# Patient Record
Sex: MALE | Race: WHITE | Employment: PART TIME | URBAN - METROPOLITAN AREA
[De-identification: names, ages, dates, MRNs, and addresses within clinical notes are randomized per-mention and may not be internally consistent; named-entity substitution may affect disease eponyms.]

---

## 2019-07-12 ENCOUNTER — HOSPITAL ENCOUNTER (EMERGENCY)
Age: 24
Discharge: HOME | End: 2019-07-13
Attending: EMERGENCY MEDICINE
Payer: COMMERCIAL

## 2019-07-12 DIAGNOSIS — E83.42 HYPOMAGNESEMIA: ICD-10-CM

## 2019-07-12 DIAGNOSIS — R11.10 ABDOMINAL PAIN, VOMITING, AND DIARRHEA: Primary | ICD-10-CM

## 2019-07-12 DIAGNOSIS — R19.7 ABDOMINAL PAIN, VOMITING, AND DIARRHEA: Primary | ICD-10-CM

## 2019-07-12 DIAGNOSIS — R10.9 ABDOMINAL PAIN, VOMITING, AND DIARRHEA: Primary | ICD-10-CM

## 2019-07-12 LAB
ALBUMIN SERPL-MCNC: 4.8 G/DL (ref 3.5–5)
ALBUMIN/GLOB SERPL: 1.5 {RATIO} (ref 1.1–2.2)
ALP SERPL-CCNC: 91 U/L (ref 45–117)
ALT SERPL-CCNC: 23 U/L (ref 12–78)
ANION GAP SERPL CALC-SCNC: 11 MMOL/L (ref 5–15)
APPEARANCE UR: CLEAR
AST SERPL-CCNC: 18 U/L (ref 15–37)
BACTERIA URNS QL MICRO: NEGATIVE /HPF
BASOPHILS # BLD: 0 K/UL (ref 0–0.1)
BASOPHILS NFR BLD: 0 % (ref 0–1)
BILIRUB SERPL-MCNC: 1.8 MG/DL (ref 0.2–1)
BILIRUB UR QL: NEGATIVE
BUN SERPL-MCNC: 15 MG/DL (ref 6–20)
BUN/CREAT SERPL: 14 (ref 12–20)
CALCIUM SERPL-MCNC: 9.4 MG/DL (ref 8.5–10.1)
CHLORIDE SERPL-SCNC: 100 MMOL/L (ref 97–108)
CO2 SERPL-SCNC: 28 MMOL/L (ref 21–32)
COLOR UR: ABNORMAL
COMMENT, HOLDF: NORMAL
CREAT SERPL-MCNC: 1.07 MG/DL (ref 0.7–1.3)
DIFFERENTIAL METHOD BLD: ABNORMAL
EOSINOPHIL # BLD: 0 K/UL (ref 0–0.4)
EOSINOPHIL NFR BLD: 0 % (ref 0–7)
EPITH CASTS URNS QL MICRO: ABNORMAL /LPF
ERYTHROCYTE [DISTWIDTH] IN BLOOD BY AUTOMATED COUNT: 11.9 % (ref 11.5–14.5)
GLOBULIN SER CALC-MCNC: 3.2 G/DL (ref 2–4)
GLUCOSE SERPL-MCNC: 145 MG/DL (ref 65–100)
GLUCOSE UR STRIP.AUTO-MCNC: NEGATIVE MG/DL
HCT VFR BLD AUTO: 44.5 % (ref 36.6–50.3)
HGB BLD-MCNC: 15.2 G/DL (ref 12.1–17)
HGB UR QL STRIP: NEGATIVE
IMM GRANULOCYTES # BLD AUTO: 0 K/UL
IMM GRANULOCYTES NFR BLD AUTO: 0 %
KETONES UR QL STRIP.AUTO: 15 MG/DL
LACTATE BLD-SCNC: 2.22 MMOL/L (ref 0.4–2)
LEUKOCYTE ESTERASE UR QL STRIP.AUTO: NEGATIVE
LIPASE SERPL-CCNC: 101 U/L (ref 73–393)
LYMPHOCYTES # BLD: 0.3 K/UL (ref 0.8–3.5)
LYMPHOCYTES NFR BLD: 3 % (ref 12–49)
MAGNESIUM SERPL-MCNC: 1.5 MG/DL (ref 1.6–2.4)
MCH RBC QN AUTO: 28 PG (ref 26–34)
MCHC RBC AUTO-ENTMCNC: 34.2 G/DL (ref 30–36.5)
MCV RBC AUTO: 82.1 FL (ref 80–99)
MONOCYTES # BLD: 0.3 K/UL (ref 0–1)
MONOCYTES NFR BLD: 3 % (ref 5–13)
MUCOUS THREADS URNS QL MICRO: ABNORMAL /LPF
NEUTS BAND NFR BLD MANUAL: 1 % (ref 0–6)
NEUTS SEG # BLD: 8.6 K/UL (ref 1.8–8)
NEUTS SEG NFR BLD: 93 % (ref 32–75)
NITRITE UR QL STRIP.AUTO: NEGATIVE
NRBC # BLD: 0 K/UL (ref 0–0.01)
NRBC BLD-RTO: 0 PER 100 WBC
PH UR STRIP: 7 [PH] (ref 5–8)
PLATELET # BLD AUTO: 227 K/UL (ref 150–400)
PMV BLD AUTO: 10.3 FL (ref 8.9–12.9)
POTASSIUM SERPL-SCNC: 4.3 MMOL/L (ref 3.5–5.1)
PROT SERPL-MCNC: 8 G/DL (ref 6.4–8.2)
PROT UR STRIP-MCNC: NEGATIVE MG/DL
RBC # BLD AUTO: 5.42 M/UL (ref 4.1–5.7)
RBC #/AREA URNS HPF: ABNORMAL /HPF (ref 0–5)
RBC MORPH BLD: ABNORMAL
SAMPLES BEING HELD,HOLD: NORMAL
SODIUM SERPL-SCNC: 139 MMOL/L (ref 136–145)
SP GR UR REFRACTOMETRY: 1.01 (ref 1–1.03)
UR CULT HOLD, URHOLD: NORMAL
UROBILINOGEN UR QL STRIP.AUTO: 0.2 EU/DL (ref 0.2–1)
WBC # BLD AUTO: 9.2 K/UL (ref 4.1–11.1)
WBC URNS QL MICRO: ABNORMAL /HPF (ref 0–4)

## 2019-07-12 PROCEDURE — 80053 COMPREHEN METABOLIC PANEL: CPT

## 2019-07-12 PROCEDURE — 83735 ASSAY OF MAGNESIUM: CPT

## 2019-07-12 PROCEDURE — 96365 THER/PROPH/DIAG IV INF INIT: CPT

## 2019-07-12 PROCEDURE — 99284 EMERGENCY DEPT VISIT MOD MDM: CPT

## 2019-07-12 PROCEDURE — 83605 ASSAY OF LACTIC ACID: CPT

## 2019-07-12 PROCEDURE — 36415 COLL VENOUS BLD VENIPUNCTURE: CPT

## 2019-07-12 PROCEDURE — 83690 ASSAY OF LIPASE: CPT

## 2019-07-12 PROCEDURE — 96375 TX/PRO/DX INJ NEW DRUG ADDON: CPT

## 2019-07-12 PROCEDURE — 81001 URINALYSIS AUTO W/SCOPE: CPT

## 2019-07-12 PROCEDURE — 74011250636 HC RX REV CODE- 250/636: Performed by: EMERGENCY MEDICINE

## 2019-07-12 PROCEDURE — 96361 HYDRATE IV INFUSION ADD-ON: CPT

## 2019-07-12 PROCEDURE — 85025 COMPLETE CBC W/AUTO DIFF WBC: CPT

## 2019-07-12 RX ORDER — KETOROLAC TROMETHAMINE 30 MG/ML
30 INJECTION, SOLUTION INTRAMUSCULAR; INTRAVENOUS
Status: COMPLETED | OUTPATIENT
Start: 2019-07-12 | End: 2019-07-12

## 2019-07-12 RX ORDER — ONDANSETRON 2 MG/ML
4 INJECTION INTRAMUSCULAR; INTRAVENOUS
Status: COMPLETED | OUTPATIENT
Start: 2019-07-12 | End: 2019-07-12

## 2019-07-12 RX ORDER — MAGNESIUM SULFATE HEPTAHYDRATE 40 MG/ML
2 INJECTION, SOLUTION INTRAVENOUS
Status: COMPLETED | OUTPATIENT
Start: 2019-07-12 | End: 2019-07-12

## 2019-07-12 RX ADMIN — KETOROLAC TROMETHAMINE 30 MG: 30 INJECTION, SOLUTION INTRAMUSCULAR at 22:21

## 2019-07-12 RX ADMIN — MAGNESIUM SULFATE HEPTAHYDRATE 2 G: 40 INJECTION, SOLUTION INTRAVENOUS at 21:09

## 2019-07-12 RX ADMIN — SODIUM CHLORIDE 1000 ML: 900 INJECTION, SOLUTION INTRAVENOUS at 19:53

## 2019-07-12 RX ADMIN — ONDANSETRON 4 MG: 2 INJECTION INTRAMUSCULAR; INTRAVENOUS at 19:53

## 2019-07-12 RX ADMIN — SODIUM CHLORIDE 1000 ML: 900 INJECTION, SOLUTION INTRAVENOUS at 21:14

## 2019-07-12 NOTE — ED TRIAGE NOTES
Abd pain, vomiting and diarrhea for a few days. Reports that during the vomiting episodes he feels tingling and numbness.

## 2019-07-13 VITALS
DIASTOLIC BLOOD PRESSURE: 70 MMHG | WEIGHT: 125.44 LBS | TEMPERATURE: 98 F | RESPIRATION RATE: 18 BRPM | OXYGEN SATURATION: 98 % | SYSTOLIC BLOOD PRESSURE: 115 MMHG | HEART RATE: 94 BPM

## 2019-07-13 RX ORDER — ONDANSETRON 4 MG/1
4 TABLET, ORALLY DISINTEGRATING ORAL
Qty: 20 TAB | Refills: 0 | Status: SHIPPED | OUTPATIENT
Start: 2019-07-13

## 2019-07-13 NOTE — ED NOTES
Mother reports that patient has been sick for 3 days. States that it began with abd pain and constipation, which can be typical for patient. He then began with diarrhea and was unable to hold anything down. Today he began with violent vomiting. States that when he vomits, he feels tingling and numbness in extremities. Has been unable to hold fluids down for 48 hours.

## 2019-07-13 NOTE — ED PROVIDER NOTES
Kandy Castellano is a 27 yo M with history of spina bifida, hydrocephalus and intracranial shunt who presents to the ED with abdominal pain, vomiting and diarrhea that has been present for 3 days but became more severe today. This afternoon he also started to feel tingling and numbness in his hands and feet. He states that he first had abdominal pain 3 days ago similar to previous episodes of constipation. The next day he felt better and had a little bit of diarrhea. Yesterday the diarrhea picked up and he also developed nausea and vomiting which today has been severe, constant and bilious. He continues to have brown watery stool. HE denies fever or chills. Past Medical History:   Diagnosis Date    Hydrocephalus     Intracranial shunt     Sp[facility] bifida [facility])        Past Surgical History:   Procedure Laterality Date    HX APPENDECTOMY           History reviewed. No pertinent family history.     Social History     Socioeconomic History    Marital status: SINGLE     Spouse name: Not on file    Number of children: Not on file    Years of education: Not on file    Highest education level: Not on file   Occupational History    Not on file   Social Needs    Financial resource strain: Not on file    Food insecurity:     Worry: Not on file     Inability: Not on file    Transportation needs:     Medical: Not on file     Non-medical: Not on file   Tobacco Use    Smoking status: Never Smoker    Smokeless tobacco: Never Used   Substance and Sexual Activity    Alcohol use: Not on file    Drug use: Not on file    Sexual activity: Not on file   Lifestyle    Physical activity:     Days per week: Not on file     Minutes per session: Not on file    Stress: Not on file   Relationships    Social connections:     Talks on phone: Not on file     Gets together: Not on file     Attends Restorationism service: Not on file     Active member of club or organization: Not on file     Attends meetings of clubs or organizations: Not on file     Relationship status: Not on file    Intimate partner violence:     Fear of current or ex partner: Not on file     Emotionally abused: Not on file     Physically abused: Not on file     Forced sexual activity: Not on file   Other Topics Concern    Not on file   Social History Narrative    Not on file         ALLERGIES: Latex    Review of Systems   Constitutional: Negative for fever. HENT: Negative for sore throat. Eyes: Negative for visual disturbance. Respiratory: Negative for cough. Cardiovascular: Negative for chest pain. Gastrointestinal: Positive for abdominal pain, diarrhea, nausea and vomiting. Genitourinary: Negative for dysuria. Musculoskeletal: Negative for back pain. Skin: Negative for rash. Neurological: Positive for numbness. Negative for headaches. Vitals:    07/12/19 1943   BP: 128/70   Pulse: (!) 101   Resp: 20   Temp: 98 °F (36.7 °C)   SpO2: 99%   Weight: 56.9 kg (125 lb 7.1 oz)            Physical Exam   Constitutional: He appears well-developed and well-nourished. He has a sickly appearance. HENT:   Head: Normocephalic and atraumatic. Mouth/Throat: Oropharynx is clear and moist.   Eyes: Conjunctivae and EOM are normal.   Neck: Normal range of motion and phonation normal. Neck supple. No meningismus   Cardiovascular: Normal rate. Pulmonary/Chest: Effort normal. No respiratory distress. He has no wheezes. He has no rhonchi. Abdominal: He exhibits no distension. There is no tenderness. Musculoskeletal: Normal range of motion. He exhibits no tenderness. Neurological: He is alert. He is not disoriented. He exhibits normal muscle tone. Skin: Skin is warm and dry. There is pallor. Nursing note and vitals reviewed. MDM     1:17 AM  Patient reassessed. Is feeling much better after IVNS, zofran and toradol. Tolerating PO. Has received 2 G Magneisum sulfate for low magnesium.   Suspect gastroenteritis  Will discharge home with prescription for zofran   Procedures

## 2019-07-13 NOTE — ED NOTES
IV assessed. Patient states that it feels cold and would like the fluids slowed down. Request crackers, blanket and ice chips.

## 2019-07-13 NOTE — ED NOTES
Patient reports feeling much better. Patient has tolerated keeping crackers and bottles of water down. Color has improved in face and is able to ambulate without issue.

## 2022-01-29 ENCOUNTER — HOSPITAL ENCOUNTER (EMERGENCY)
Age: 27
Discharge: HOME | End: 2022-01-29
Attending: EMERGENCY MEDICINE
Payer: COMMERCIAL

## 2022-01-29 ENCOUNTER — APPOINTMENT (OUTPATIENT)
Dept: ULTRASOUND IMAGING | Age: 27
End: 2022-01-29
Attending: EMERGENCY MEDICINE
Payer: COMMERCIAL

## 2022-01-29 ENCOUNTER — ANESTHESIA (OUTPATIENT)
Dept: SURGERY | Age: 27
End: 2022-01-29
Payer: COMMERCIAL

## 2022-01-29 ENCOUNTER — ANESTHESIA EVENT (OUTPATIENT)
Dept: SURGERY | Age: 27
End: 2022-01-29
Payer: COMMERCIAL

## 2022-01-29 VITALS
TEMPERATURE: 97.5 F | DIASTOLIC BLOOD PRESSURE: 68 MMHG | RESPIRATION RATE: 12 BRPM | HEART RATE: 71 BPM | OXYGEN SATURATION: 100 % | WEIGHT: 130 LBS | SYSTOLIC BLOOD PRESSURE: 132 MMHG

## 2022-01-29 DIAGNOSIS — N44.00 TORSION OF RIGHT TESTICLE: Primary | ICD-10-CM

## 2022-01-29 LAB
ALBUMIN SERPL-MCNC: 4.1 G/DL (ref 3.5–5)
ALBUMIN/GLOB SERPL: 1 {RATIO} (ref 1.1–2.2)
ALP SERPL-CCNC: 91 U/L (ref 45–117)
ALT SERPL-CCNC: 26 U/L (ref 12–78)
ANION GAP SERPL CALC-SCNC: 11 MMOL/L (ref 5–15)
APPEARANCE UR: ABNORMAL
AST SERPL-CCNC: 15 U/L (ref 15–37)
BACTERIA URNS QL MICRO: ABNORMAL /HPF
BASOPHILS # BLD: 0.1 K/UL (ref 0–0.1)
BASOPHILS NFR BLD: 1 % (ref 0–1)
BILIRUB SERPL-MCNC: 0.6 MG/DL (ref 0.2–1)
BILIRUB UR QL: NEGATIVE
BUN SERPL-MCNC: 8 MG/DL (ref 6–20)
BUN/CREAT SERPL: 8 (ref 12–20)
CALCIUM SERPL-MCNC: 9.1 MG/DL (ref 8.5–10.1)
CHLORIDE SERPL-SCNC: 100 MMOL/L (ref 97–108)
CO2 SERPL-SCNC: 28 MMOL/L (ref 21–32)
COLOR UR: YELLOW
COVID-19 RAPID TEST, COVR: NOT DETECTED
CREAT SERPL-MCNC: 0.95 MG/DL (ref 0.7–1.3)
DIFFERENTIAL METHOD BLD: NORMAL
EOSINOPHIL # BLD: 0.1 K/UL (ref 0–0.4)
EOSINOPHIL NFR BLD: 1 % (ref 0–7)
EPITH CASTS URNS QL MICRO: ABNORMAL /LPF
ERYTHROCYTE [DISTWIDTH] IN BLOOD BY AUTOMATED COUNT: 11.9 % (ref 11.5–14.5)
GLOBULIN SER CALC-MCNC: 4.2 G/DL (ref 2–4)
GLUCOSE SERPL-MCNC: 122 MG/DL (ref 65–100)
GLUCOSE UR STRIP.AUTO-MCNC: NEGATIVE MG/DL
HCT VFR BLD AUTO: 41.8 % (ref 36.6–50.3)
HGB BLD-MCNC: 14 G/DL (ref 12.1–17)
HGB UR QL STRIP: ABNORMAL
IMM GRANULOCYTES # BLD AUTO: 0 K/UL (ref 0–0.04)
IMM GRANULOCYTES NFR BLD AUTO: 0 % (ref 0–0.5)
KETONES UR QL STRIP.AUTO: NEGATIVE MG/DL
LEUKOCYTE ESTERASE UR QL STRIP.AUTO: ABNORMAL
LYMPHOCYTES # BLD: 1.9 K/UL (ref 0.8–3.5)
LYMPHOCYTES NFR BLD: 19 % (ref 12–49)
MCH RBC QN AUTO: 28.2 PG (ref 26–34)
MCHC RBC AUTO-ENTMCNC: 33.5 G/DL (ref 30–36.5)
MCV RBC AUTO: 84.1 FL (ref 80–99)
MONOCYTES # BLD: 0.6 K/UL (ref 0–1)
MONOCYTES NFR BLD: 6 % (ref 5–13)
MUCOUS THREADS URNS QL MICRO: ABNORMAL /LPF
NEUTS SEG # BLD: 7.4 K/UL (ref 1.8–8)
NEUTS SEG NFR BLD: 73 % (ref 32–75)
NITRITE UR QL STRIP.AUTO: NEGATIVE
NRBC # BLD: 0 K/UL (ref 0–0.01)
NRBC BLD-RTO: 0 PER 100 WBC
PH UR STRIP: 5.5 [PH] (ref 5–8)
PLATELET # BLD AUTO: 391 K/UL (ref 150–400)
PMV BLD AUTO: 9.5 FL (ref 8.9–12.9)
POTASSIUM SERPL-SCNC: 3.8 MMOL/L (ref 3.5–5.1)
PROT SERPL-MCNC: 8.3 G/DL (ref 6.4–8.2)
PROT UR STRIP-MCNC: ABNORMAL MG/DL
RBC # BLD AUTO: 4.97 M/UL (ref 4.1–5.7)
RBC #/AREA URNS HPF: ABNORMAL /HPF (ref 0–5)
SODIUM SERPL-SCNC: 139 MMOL/L (ref 136–145)
SOURCE, COVRS: NORMAL
SP GR UR REFRACTOMETRY: 1.02 (ref 1–1.03)
UROBILINOGEN UR QL STRIP.AUTO: 0.2 EU/DL (ref 0.2–1)
WBC # BLD AUTO: 10.1 K/UL (ref 4.1–11.1)
WBC URNS QL MICRO: >100 /HPF (ref 0–4)

## 2022-01-29 PROCEDURE — 87186 SC STD MICRODIL/AGAR DIL: CPT

## 2022-01-29 PROCEDURE — 74011000250 HC RX REV CODE- 250: Performed by: UROLOGY

## 2022-01-29 PROCEDURE — 77030031139 HC SUT VCRL2 J&J -A: Performed by: UROLOGY

## 2022-01-29 PROCEDURE — 76870 US EXAM SCROTUM: CPT

## 2022-01-29 PROCEDURE — 76210000016 HC OR PH I REC 1 TO 1.5 HR: Performed by: UROLOGY

## 2022-01-29 PROCEDURE — 87040 BLOOD CULTURE FOR BACTERIA: CPT

## 2022-01-29 PROCEDURE — 36415 COLL VENOUS BLD VENIPUNCTURE: CPT

## 2022-01-29 PROCEDURE — 77030026438 HC STYL ET INTUB CARD -A: Performed by: STUDENT IN AN ORGANIZED HEALTH CARE EDUCATION/TRAINING PROGRAM

## 2022-01-29 PROCEDURE — 96375 TX/PRO/DX INJ NEW DRUG ADDON: CPT

## 2022-01-29 PROCEDURE — 77030002933 HC SUT MCRYL J&J -A: Performed by: UROLOGY

## 2022-01-29 PROCEDURE — 74011000250 HC RX REV CODE- 250: Performed by: EMERGENCY MEDICINE

## 2022-01-29 PROCEDURE — 81001 URINALYSIS AUTO W/SCOPE: CPT

## 2022-01-29 PROCEDURE — 77030008684 HC TU ET CUF COVD -B: Performed by: STUDENT IN AN ORGANIZED HEALTH CARE EDUCATION/TRAINING PROGRAM

## 2022-01-29 PROCEDURE — 87086 URINE CULTURE/COLONY COUNT: CPT

## 2022-01-29 PROCEDURE — 77030013079 HC BLNKT BAIR HGGR 3M -A: Performed by: STUDENT IN AN ORGANIZED HEALTH CARE EDUCATION/TRAINING PROGRAM

## 2022-01-29 PROCEDURE — 77030002986 HC SUT PROL J&J -A: Performed by: UROLOGY

## 2022-01-29 PROCEDURE — 74011000250 HC RX REV CODE- 250: Performed by: ANESTHESIOLOGY

## 2022-01-29 PROCEDURE — 80053 COMPREHEN METABOLIC PANEL: CPT

## 2022-01-29 PROCEDURE — 99284 EMERGENCY DEPT VISIT MOD MDM: CPT

## 2022-01-29 PROCEDURE — 74011250636 HC RX REV CODE- 250/636: Performed by: ANESTHESIOLOGY

## 2022-01-29 PROCEDURE — 74011250637 HC RX REV CODE- 250/637: Performed by: STUDENT IN AN ORGANIZED HEALTH CARE EDUCATION/TRAINING PROGRAM

## 2022-01-29 PROCEDURE — 76010000153 HC OR TIME 1.5 TO 2 HR: Performed by: UROLOGY

## 2022-01-29 PROCEDURE — 74011250636 HC RX REV CODE- 250/636: Performed by: EMERGENCY MEDICINE

## 2022-01-29 PROCEDURE — 2709999900 HC NON-CHARGEABLE SUPPLY: Performed by: UROLOGY

## 2022-01-29 PROCEDURE — 87077 CULTURE AEROBIC IDENTIFY: CPT

## 2022-01-29 PROCEDURE — 74011250636 HC RX REV CODE- 250/636: Performed by: STUDENT IN AN ORGANIZED HEALTH CARE EDUCATION/TRAINING PROGRAM

## 2022-01-29 PROCEDURE — 87635 SARS-COV-2 COVID-19 AMP PRB: CPT

## 2022-01-29 PROCEDURE — 96376 TX/PRO/DX INJ SAME DRUG ADON: CPT

## 2022-01-29 PROCEDURE — 85025 COMPLETE CBC W/AUTO DIFF WBC: CPT

## 2022-01-29 PROCEDURE — 77030010507 HC ADH SKN DERMBND J&J -B: Performed by: UROLOGY

## 2022-01-29 PROCEDURE — 76060000034 HC ANESTHESIA 1.5 TO 2 HR: Performed by: UROLOGY

## 2022-01-29 PROCEDURE — 96374 THER/PROPH/DIAG INJ IV PUSH: CPT

## 2022-01-29 PROCEDURE — 77030011283 HC ELECTRD NDL COVD -A: Performed by: UROLOGY

## 2022-01-29 PROCEDURE — 74011000250 HC RX REV CODE- 250: Performed by: STUDENT IN AN ORGANIZED HEALTH CARE EDUCATION/TRAINING PROGRAM

## 2022-01-29 RX ORDER — SODIUM CHLORIDE 0.9 % (FLUSH) 0.9 %
5-40 SYRINGE (ML) INJECTION AS NEEDED
Status: DISCONTINUED | OUTPATIENT
Start: 2022-01-29 | End: 2022-01-29 | Stop reason: HOSPADM

## 2022-01-29 RX ORDER — ONDANSETRON 2 MG/ML
INJECTION INTRAMUSCULAR; INTRAVENOUS AS NEEDED
Status: DISCONTINUED | OUTPATIENT
Start: 2022-01-29 | End: 2022-01-29 | Stop reason: HOSPADM

## 2022-01-29 RX ORDER — CEFAZOLIN SODIUM 1 G/3ML
INJECTION, POWDER, FOR SOLUTION INTRAMUSCULAR; INTRAVENOUS AS NEEDED
Status: DISCONTINUED | OUTPATIENT
Start: 2022-01-29 | End: 2022-01-29 | Stop reason: HOSPADM

## 2022-01-29 RX ORDER — HYDROMORPHONE HYDROCHLORIDE 1 MG/ML
0.2 INJECTION, SOLUTION INTRAMUSCULAR; INTRAVENOUS; SUBCUTANEOUS
Status: DISCONTINUED | OUTPATIENT
Start: 2022-01-29 | End: 2022-01-29 | Stop reason: HOSPADM

## 2022-01-29 RX ORDER — DEXAMETHASONE SODIUM PHOSPHATE 4 MG/ML
INJECTION, SOLUTION INTRA-ARTICULAR; INTRALESIONAL; INTRAMUSCULAR; INTRAVENOUS; SOFT TISSUE AS NEEDED
Status: DISCONTINUED | OUTPATIENT
Start: 2022-01-29 | End: 2022-01-29 | Stop reason: HOSPADM

## 2022-01-29 RX ORDER — SODIUM CHLORIDE 0.9 % (FLUSH) 0.9 %
5-40 SYRINGE (ML) INJECTION EVERY 8 HOURS
Status: DISCONTINUED | OUTPATIENT
Start: 2022-01-29 | End: 2022-01-29 | Stop reason: HOSPADM

## 2022-01-29 RX ORDER — FENTANYL CITRATE 50 UG/ML
50 INJECTION, SOLUTION INTRAMUSCULAR; INTRAVENOUS
Status: COMPLETED | OUTPATIENT
Start: 2022-01-29 | End: 2022-01-29

## 2022-01-29 RX ORDER — SODIUM CHLORIDE, SODIUM LACTATE, POTASSIUM CHLORIDE, CALCIUM CHLORIDE 600; 310; 30; 20 MG/100ML; MG/100ML; MG/100ML; MG/100ML
INJECTION, SOLUTION INTRAVENOUS
Status: DISCONTINUED | OUTPATIENT
Start: 2022-01-29 | End: 2022-01-29 | Stop reason: HOSPADM

## 2022-01-29 RX ORDER — FENTANYL CITRATE 50 UG/ML
50 INJECTION, SOLUTION INTRAMUSCULAR; INTRAVENOUS ONCE
Status: COMPLETED | OUTPATIENT
Start: 2022-01-29 | End: 2022-01-29

## 2022-01-29 RX ORDER — MORPHINE SULFATE 2 MG/ML
2 INJECTION, SOLUTION INTRAMUSCULAR; INTRAVENOUS
Status: DISCONTINUED | OUTPATIENT
Start: 2022-01-29 | End: 2022-01-29 | Stop reason: HOSPADM

## 2022-01-29 RX ORDER — SODIUM CHLORIDE, SODIUM LACTATE, POTASSIUM CHLORIDE, CALCIUM CHLORIDE 600; 310; 30; 20 MG/100ML; MG/100ML; MG/100ML; MG/100ML
125 INJECTION, SOLUTION INTRAVENOUS CONTINUOUS
Status: DISCONTINUED | OUTPATIENT
Start: 2022-01-29 | End: 2022-01-29 | Stop reason: HOSPADM

## 2022-01-29 RX ORDER — ROCURONIUM BROMIDE 10 MG/ML
INJECTION, SOLUTION INTRAVENOUS AS NEEDED
Status: DISCONTINUED | OUTPATIENT
Start: 2022-01-29 | End: 2022-01-29 | Stop reason: HOSPADM

## 2022-01-29 RX ORDER — ONDANSETRON 2 MG/ML
4 INJECTION INTRAMUSCULAR; INTRAVENOUS AS NEEDED
Status: DISCONTINUED | OUTPATIENT
Start: 2022-01-29 | End: 2022-01-29 | Stop reason: HOSPADM

## 2022-01-29 RX ORDER — BUPIVACAINE HYDROCHLORIDE 2.5 MG/ML
INJECTION, SOLUTION EPIDURAL; INFILTRATION; INTRACAUDAL AS NEEDED
Status: DISCONTINUED | OUTPATIENT
Start: 2022-01-29 | End: 2022-01-29 | Stop reason: HOSPADM

## 2022-01-29 RX ORDER — PROPOFOL 10 MG/ML
INJECTION, EMULSION INTRAVENOUS AS NEEDED
Status: DISCONTINUED | OUTPATIENT
Start: 2022-01-29 | End: 2022-01-29 | Stop reason: HOSPADM

## 2022-01-29 RX ORDER — ACETAMINOPHEN 500 MG
1000 TABLET ORAL ONCE
Status: DISCONTINUED | OUTPATIENT
Start: 2022-01-29 | End: 2022-01-29 | Stop reason: HOSPADM

## 2022-01-29 RX ORDER — MIDAZOLAM HYDROCHLORIDE 1 MG/ML
0.5 INJECTION, SOLUTION INTRAMUSCULAR; INTRAVENOUS
Status: DISCONTINUED | OUTPATIENT
Start: 2022-01-29 | End: 2022-01-29 | Stop reason: HOSPADM

## 2022-01-29 RX ORDER — FENTANYL CITRATE 50 UG/ML
INJECTION, SOLUTION INTRAMUSCULAR; INTRAVENOUS AS NEEDED
Status: DISCONTINUED | OUTPATIENT
Start: 2022-01-29 | End: 2022-01-29 | Stop reason: HOSPADM

## 2022-01-29 RX ORDER — DIPHENHYDRAMINE HYDROCHLORIDE 50 MG/ML
12.5 INJECTION, SOLUTION INTRAMUSCULAR; INTRAVENOUS AS NEEDED
Status: DISCONTINUED | OUTPATIENT
Start: 2022-01-29 | End: 2022-01-29 | Stop reason: HOSPADM

## 2022-01-29 RX ORDER — MIDAZOLAM HYDROCHLORIDE 1 MG/ML
1 INJECTION, SOLUTION INTRAMUSCULAR; INTRAVENOUS AS NEEDED
Status: DISCONTINUED | OUTPATIENT
Start: 2022-01-29 | End: 2022-01-29 | Stop reason: HOSPADM

## 2022-01-29 RX ORDER — EPHEDRINE SULFATE/0.9% NACL/PF 50 MG/5 ML
5 SYRINGE (ML) INTRAVENOUS AS NEEDED
Status: DISCONTINUED | OUTPATIENT
Start: 2022-01-29 | End: 2022-01-29 | Stop reason: HOSPADM

## 2022-01-29 RX ORDER — DEXMEDETOMIDINE HYDROCHLORIDE 100 UG/ML
INJECTION, SOLUTION INTRAVENOUS AS NEEDED
Status: DISCONTINUED | OUTPATIENT
Start: 2022-01-29 | End: 2022-01-29 | Stop reason: HOSPADM

## 2022-01-29 RX ORDER — MIDAZOLAM HYDROCHLORIDE 1 MG/ML
INJECTION, SOLUTION INTRAMUSCULAR; INTRAVENOUS AS NEEDED
Status: DISCONTINUED | OUTPATIENT
Start: 2022-01-29 | End: 2022-01-29 | Stop reason: HOSPADM

## 2022-01-29 RX ORDER — ONDANSETRON 2 MG/ML
4 INJECTION INTRAMUSCULAR; INTRAVENOUS
Status: COMPLETED | OUTPATIENT
Start: 2022-01-29 | End: 2022-01-29

## 2022-01-29 RX ORDER — AMOXICILLIN 250 MG
1 CAPSULE ORAL 2 TIMES DAILY
Qty: 60 TABLET | Refills: 1 | Status: SHIPPED | OUTPATIENT
Start: 2022-01-29

## 2022-01-29 RX ORDER — GLYCOPYRROLATE 0.2 MG/ML
INJECTION INTRAMUSCULAR; INTRAVENOUS AS NEEDED
Status: DISCONTINUED | OUTPATIENT
Start: 2022-01-29 | End: 2022-01-29 | Stop reason: HOSPADM

## 2022-01-29 RX ORDER — IBUPROFEN 600 MG/1
600 TABLET ORAL
Status: DISCONTINUED | OUTPATIENT
Start: 2022-01-29 | End: 2022-01-29 | Stop reason: HOSPADM

## 2022-01-29 RX ORDER — LIDOCAINE HYDROCHLORIDE 10 MG/ML
0.1 INJECTION, SOLUTION EPIDURAL; INFILTRATION; INTRACAUDAL; PERINEURAL AS NEEDED
Status: DISCONTINUED | OUTPATIENT
Start: 2022-01-29 | End: 2022-01-29 | Stop reason: HOSPADM

## 2022-01-29 RX ORDER — SODIUM CHLORIDE 9 MG/ML
1000 INJECTION, SOLUTION INTRAVENOUS CONTINUOUS
Status: DISCONTINUED | OUTPATIENT
Start: 2022-01-29 | End: 2022-01-29 | Stop reason: HOSPADM

## 2022-01-29 RX ORDER — FENTANYL CITRATE 50 UG/ML
25 INJECTION, SOLUTION INTRAMUSCULAR; INTRAVENOUS
Status: DISCONTINUED | OUTPATIENT
Start: 2022-01-29 | End: 2022-01-29 | Stop reason: HOSPADM

## 2022-01-29 RX ORDER — SODIUM CHLORIDE 9 MG/ML
125 INJECTION, SOLUTION INTRAVENOUS CONTINUOUS
Status: DISCONTINUED | OUTPATIENT
Start: 2022-01-29 | End: 2022-01-29 | Stop reason: HOSPADM

## 2022-01-29 RX ORDER — OXYCODONE AND ACETAMINOPHEN 5; 325 MG/1; MG/1
1 TABLET ORAL AS NEEDED
Status: DISCONTINUED | OUTPATIENT
Start: 2022-01-29 | End: 2022-01-29 | Stop reason: HOSPADM

## 2022-01-29 RX ORDER — FENTANYL CITRATE 50 UG/ML
50 INJECTION, SOLUTION INTRAMUSCULAR; INTRAVENOUS AS NEEDED
Status: DISCONTINUED | OUTPATIENT
Start: 2022-01-29 | End: 2022-01-29 | Stop reason: HOSPADM

## 2022-01-29 RX ORDER — OXYCODONE HYDROCHLORIDE 5 MG/1
5 TABLET ORAL
Qty: 10 TABLET | Refills: 0 | Status: SHIPPED | OUTPATIENT
Start: 2022-01-29 | End: 2022-02-05

## 2022-01-29 RX ORDER — LIDOCAINE HYDROCHLORIDE 20 MG/ML
INJECTION, SOLUTION EPIDURAL; INFILTRATION; INTRACAUDAL; PERINEURAL AS NEEDED
Status: DISCONTINUED | OUTPATIENT
Start: 2022-01-29 | End: 2022-01-29 | Stop reason: HOSPADM

## 2022-01-29 RX ORDER — SODIUM CHLORIDE, SODIUM LACTATE, POTASSIUM CHLORIDE, CALCIUM CHLORIDE 600; 310; 30; 20 MG/100ML; MG/100ML; MG/100ML; MG/100ML
1000 INJECTION, SOLUTION INTRAVENOUS CONTINUOUS
Status: DISCONTINUED | OUTPATIENT
Start: 2022-01-29 | End: 2022-01-29 | Stop reason: HOSPADM

## 2022-01-29 RX ADMIN — PROPOFOL 120 MG: 10 INJECTION, EMULSION INTRAVENOUS at 12:52

## 2022-01-29 RX ADMIN — DEXAMETHASONE SODIUM PHOSPHATE 4 MG: 4 INJECTION, SOLUTION INTRAMUSCULAR; INTRAVENOUS at 13:17

## 2022-01-29 RX ADMIN — SODIUM CHLORIDE 1000 ML: 9 INJECTION, SOLUTION INTRAVENOUS at 07:35

## 2022-01-29 RX ADMIN — CEFTRIAXONE SODIUM 1 G: 1 INJECTION, POWDER, FOR SOLUTION INTRAMUSCULAR; INTRAVENOUS at 11:22

## 2022-01-29 RX ADMIN — OXYCODONE HYDROCHLORIDE AND ACETAMINOPHEN 1 TABLET: 5; 325 TABLET ORAL at 15:26

## 2022-01-29 RX ADMIN — DEXMEDETOMIDINE HYDROCHLORIDE 5 MCG: 100 INJECTION, SOLUTION, CONCENTRATE INTRAVENOUS at 13:47

## 2022-01-29 RX ADMIN — GLYCOPYRROLATE 0.2 MG: 0.2 INJECTION, SOLUTION INTRAMUSCULAR; INTRAVENOUS at 13:38

## 2022-01-29 RX ADMIN — SODIUM CHLORIDE, POTASSIUM CHLORIDE, SODIUM LACTATE AND CALCIUM CHLORIDE: 600; 310; 30; 20 INJECTION, SOLUTION INTRAVENOUS at 12:44

## 2022-01-29 RX ADMIN — SODIUM CHLORIDE, PRESERVATIVE FREE 10 ML: 5 INJECTION INTRAVENOUS at 15:00

## 2022-01-29 RX ADMIN — ONDANSETRON HYDROCHLORIDE 4 MG: 2 SOLUTION INTRAMUSCULAR; INTRAVENOUS at 07:34

## 2022-01-29 RX ADMIN — FENTANYL CITRATE 50 MCG: 50 INJECTION INTRAMUSCULAR; INTRAVENOUS at 07:34

## 2022-01-29 RX ADMIN — SUGAMMADEX 200 MG: 100 INJECTION, SOLUTION INTRAVENOUS at 14:10

## 2022-01-29 RX ADMIN — SODIUM CHLORIDE, POTASSIUM CHLORIDE, SODIUM LACTATE AND CALCIUM CHLORIDE 1000 ML: 600; 310; 30; 20 INJECTION, SOLUTION INTRAVENOUS at 12:15

## 2022-01-29 RX ADMIN — FENTANYL CITRATE 50 MCG: 50 INJECTION INTRAMUSCULAR; INTRAVENOUS at 08:42

## 2022-01-29 RX ADMIN — DEXMEDETOMIDINE HYDROCHLORIDE 5 MCG: 100 INJECTION, SOLUTION, CONCENTRATE INTRAVENOUS at 13:51

## 2022-01-29 RX ADMIN — MIDAZOLAM 2 MG: 1 INJECTION INTRAMUSCULAR; INTRAVENOUS at 12:44

## 2022-01-29 RX ADMIN — FENTANYL CITRATE 50 MCG: 50 INJECTION, SOLUTION INTRAMUSCULAR; INTRAVENOUS at 12:52

## 2022-01-29 RX ADMIN — FENTANYL CITRATE 50 MCG: 50 INJECTION, SOLUTION INTRAMUSCULAR; INTRAVENOUS at 13:15

## 2022-01-29 RX ADMIN — FENTANYL CITRATE 50 MCG: 50 INJECTION, SOLUTION INTRAMUSCULAR; INTRAVENOUS at 10:49

## 2022-01-29 RX ADMIN — LIDOCAINE HYDROCHLORIDE 100 MG: 20 INJECTION, SOLUTION EPIDURAL; INFILTRATION; INTRACAUDAL; PERINEURAL at 12:52

## 2022-01-29 RX ADMIN — SODIUM CHLORIDE 125 ML/HR: 900 INJECTION, SOLUTION INTRAVENOUS at 15:00

## 2022-01-29 RX ADMIN — ONDANSETRON HYDROCHLORIDE 4 MG: 2 INJECTION, SOLUTION INTRAMUSCULAR; INTRAVENOUS at 13:46

## 2022-01-29 RX ADMIN — GLYCOPYRROLATE 0.2 MG: 0.2 INJECTION, SOLUTION INTRAMUSCULAR; INTRAVENOUS at 13:37

## 2022-01-29 RX ADMIN — CEFAZOLIN 2 G: 330 INJECTION, POWDER, FOR SOLUTION INTRAMUSCULAR; INTRAVENOUS at 13:14

## 2022-01-29 RX ADMIN — ROCURONIUM BROMIDE 50 MG: 10 SOLUTION INTRAVENOUS at 12:53

## 2022-01-29 NOTE — ANESTHESIA POSTPROCEDURE EVALUATION
Procedure(s):  RIGHT TESTICULAR TORSION REPAIR, bilateral orchiopexy, scrotal exploration. general    Anesthesia Post Evaluation      Multimodal analgesia: multimodal analgesia used between 6 hours prior to anesthesia start to PACU discharge  Patient location during evaluation: bedside  Patient participation: complete - patient participated  Level of consciousness: awake  Pain score: 0  Pain management: satisfactory to patient  Airway patency: patent  Anesthetic complications: no  Cardiovascular status: acceptable and blood pressure returned to baseline  Respiratory status: acceptable  Hydration status: acceptable  Comments: I have evaluated the patient and meets criteria for discharge from PACU. Mary Nicole DO.   Post anesthesia nausea and vomiting:  none  Final Post Anesthesia Temperature Assessment:  Normothermia (36.0-37.5 degrees C)      INITIAL Post-op Vital signs:   Vitals Value Taken Time   /63 01/29/22 1430   Temp 36.4 °C (97.5 °F) 01/29/22 1430   Pulse 80 01/29/22 1430   Resp 14 01/29/22 1430   SpO2 100 % 01/29/22 1426

## 2022-01-29 NOTE — ED NOTES
[facility] here to transport pt lights and sirens to [facility] ED. Pt VSS upon transfer.

## 2022-01-29 NOTE — ED NOTES
Pt arrives to the ED via EMS as a transfer from [facility] ER with a diagnosis of testicular torsion of the right teste. Pt also diagnosed UTI. Weight is 59 kg.

## 2022-01-29 NOTE — H&P
New Urology Consult Note      Service Providing Consult: Urology      Assessment:    Brenda Tran is a 27 y.o. male with right testicular torsion. Spina bifida history, possible UTI (may very well have chronic bacteriuria). Recommendations:  -- OR now for scrotal exploration, right testicular torsion reduction, bilateral orchiopexy, possible right orchiectomy  -- Understands risks of hematuria, infection, injury surrounding structures, left orchiopexy recommendation, possibility of infarcted or dead right testicle that may require orchiectomy. Discussed possibility of relation to infection and that there may not be torsion and this could be an infarcted testicle secondary to right epididymo-orchitis. Based on exam and history I do favor torsion though  -- Overnight stay vs outpatient procedure    Thank you for this consult. Please contact [facility] with any further questions/concerns. Mariela      HPI -     Reason for Consult:  Right testicular torsion    Brenda Tran is seen in consultation for reasons noted above at the request of Kaz Padilla MD.    This is a 27 y.o. male with a history of spina bifida,  shunt, hydrocephalus. Presented to stand alone [place] ER this morning with right testicle pain. Urine sample appears grossly infected. Scrotal US showed right torsion. Attempts at detorsion at standalone ER with Dr. Samantha Merino, transferred to [facility]. Pain better. Repeat scrotal US in setting of possible infection and improvement in symptoms with ongoing right testicular torsion. Symptoms this morning 6AM. Unaccompanied. No issues yesterday. Voids at baseline on his own with occasional incontinence, no CIC or Singh/SPT requirement. No recent fevers. Scrotal US x2 personally reviewed.     Past Medical History:  Past Medical History:   Diagnosis Date    Hydrocephalus ([facility] [address]     Intracranial shunt     Spina bifida [facility])        Past Surgical History:   Past Surgical History: Procedure Laterality Date    HX APPENDECTOMY         Medication:    Current Facility-Administered Medications:     ibuprofen (MOTRIN) tablet 600 mg, 600 mg, Oral, NOW, Juan Lopez MD    Current Outpatient Medications:     ondansetron (ZOFRAN ODT) 4 mg disintegrating tablet, Take 1 Tab by mouth every eight (8) hours as needed for Nausea., Disp: 20 Tab, Rfl: 0    Allergies: Allergies   Allergen Reactions    Latex Unknown (comments)       Social History:  Social History     Tobacco Use    Smoking status: Never Smoker    Smokeless tobacco: Never Used   Substance Use Topics    Alcohol use: Not on file    Drug use: Not on file       Family History  History reviewed. No pertinent family history. Review of Systems  10 systems were reviewed and are negative except as noted specifically in the HPI.     Objective     Vital signs in last 24 hours:  Visit Vitals  /81   Pulse 65   Temp 98.4 °F (36.9 °C)   Resp 18   Wt 59 kg (130 lb)   SpO2 97%       Intake/Output last 3 shifts:  No intake or output data in the 24 hours ending 01/29/22 1155    Physical Exam  General: NAD, A&O, resting, appropriate  HEENT: NC/AT, EOMI, MMM  Pulmonary: Normal work of breathing on RA  Cardiovascular: Regular rate & rhythm, HDS, adequate peripheral perfusion  Abdomen: soft, NTTP, nondistended, no suprapubic fullness or tenderness  : right testicle induration, TTP, transverse lie and high riding in hemiscrotum, no left sided abnormalities appreciated  Extremities: warm and well perfused, no edema    Most Recent Labs:  CBC -   Lab Results   Component Value Date/Time    WBC 10.1 01/29/2022 07:30 AM    HGB 14.0 01/29/2022 07:30 AM    HCT 41.8 01/29/2022 07:30 AM    PLATELET 217 62/29/9762 07:30 AM    MCV 84.1 01/29/2022 07:30 AM        BMP/CMP -   Lab Results   Component Value Date/Time    Sodium 139 01/29/2022 07:30 AM    Potassium 3.8 01/29/2022 07:30 AM    Chloride 100 01/29/2022 07:30 AM    CO2 28 01/29/2022 07:30 AM    Anion gap 11 01/29/2022 07:30 AM    Glucose 122 (H) 01/29/2022 07:30 AM    BUN 8 01/29/2022 07:30 AM    Creatinine 0.95 01/29/2022 07:30 AM    BUN/Creatinine ratio 8 (L) 01/29/2022 07:30 AM    GFR est AA >60 01/29/2022 07:30 AM    GFR est non-AA >60 01/29/2022 07:30 AM    Calcium 9.1 01/29/2022 07:30 AM    Bilirubin, total 0.6 01/29/2022 07:30 AM    Alk. phosphatase 91 01/29/2022 07:30 AM    Protein, total 8.3 (H) 01/29/2022 07:30 AM    Albumin 4.1 01/29/2022 07:30 AM    Globulin 4.2 (H) 01/29/2022 07:30 AM    A-G Ratio 1.0 (L) 01/29/2022 07:30 AM    ALT (SGPT) 26 01/29/2022 07:30 AM        PSA - No results found for: PSA, PSA2, PSAR1, Christinia Otter, PSAR3, [id number], [id number], 40481, PSAEXT     COAGS - No results found for: APTT, PTP, INR, INREXT    HbA1c - No results found for: HBA1C, OJP3LIBN, UDC6UKYC     Urine/Blood Cultures:  Results     Procedure Component Value Units Date/Time    CULTURE, BLOOD, PAIRED [[id number]] Collected: 01/29/22 1125    Order Status: Completed Specimen: Blood Updated: 01/29/22 1129    CULTURE, URINE [[id number]]     Order Status: Canceled Specimen: Urine from Clean catch     COVID-19 RAPID TEST [[id number]] Collected: 01/29/22 0846    Order Status: Completed Specimen: Nasopharyngeal Updated: 01/29/22 0918     Specimen source Nasopharyngeal        COVID-19 rapid test Not detected        Comment: Rapid Abbott ID Now       Rapid NAAT:  The specimen is NEGATIVE for SARS-CoV-2, the novel coronavirus associated with COVID-19. Negative results should be treated as presumptive and, if inconsistent with clinical signs and symptoms or necessary for patient management, should be tested with an alternative molecular assay. Negative results do not preclude SARS-CoV-2 infection and should not be used as the sole basis for patient management decisions. This test has been authorized by the FDA under an Emergency Use Authorization (EUA) for use by authorized laboratories.    Fact sheet for Healthcare Providers: Ross.co.nz  Fact sheet for Patients: ConventionUpdate.co.nz       Methodology: Isothermal Nucleic Acid Amplification         CULTURE, URINE [[id number]] Collected: 01/29/22 2316    Order Status: Completed Specimen: Urine from Clean catch Updated: 01/29/22 1109           Imaging:  US SCROTUM/TESTICLES    Result Date: 1/29/2022  EXAM: US SCROTUM/TESTICLES INDICATION: Follow-up torsion. . COMPARISON: Ultrasound 2 hours previously. . TECHNIQUE: Real-time sonography of the scrotum was performed with a high frequency linear transducer. Multiple static images were obtained. Color Doppler evaluation was also performed. FINDINGS: RIGHT TESTICLE: The right testicle is enlarged in size with no identifiable Doppler flow and there is a varicocele. Right testicular measurements are 3.0 x 3.4 x 3.9 cm for estimated volume of 21.3 mL. RIGHT EPIDIDYMIS: The right epididymis is enlarged and avascular. LEFT TESTICLE: The left testicle is normal in size and echotexture with normal color-flow. The left testis measures 2.5 x 2.9 x 4.0 cm and the left testicular volume is 15.4 mL. LEFT EPIDIDYMIS: The left epididymis is normal.     Persistent right testicular torsion and persistent enlarged avascular right epididymis. US SCROTUM/TESTICLES    Result Date: 1/29/2022  INDICATION:  Right scrotal pain since 6:00 AM EXAM: SCROTAL ULTRASOUND. PROCEDURE: The scrotum was scanned sonographically, using both grayscale imaging and pulsed wave Doppler with color. Testicular volumes were calculated. FINDINGS: The right testis measures 4.7 x 3.7 x 3.4 cm. Right testicular volume is 28.3 cc. The left testis measures 3.7 x 2.5 x 1.9 cm. Left testicular volume is  8.9 cc . The testes are symmetric and mildly heterogeneous in echogenicity but show enlargement of the right testis with no detectable blood flow by color Doppler in the right testis.  Blood flow is detected in the left testis. . Small right hydrocele. The epididymi are asymmetric, with enlargement on the right, although increased blood flow is not demonstrated. .. There is no inguinal lymphadenopathy. 1. Right testicular torsion. 2. Enlarged epididymis without hypervascularity, significance uncertain. 3. Small right hydrocele. The findings were called to Dr. Mandy Lao on 1/29/2022 at 0825 hours by Dr. Jovanni Hernandez.   [place] [id number]

## 2022-01-29 NOTE — ED PROVIDER NOTES
Please note that this dictation was completed with [employer], the computer voice recognition software.  Quite often unanticipated grammatical, syntax, homophones, and other interpretive errors are inadvertently transcribed by the computer software.  Please disregard these errors.  Please excuse any errors that have escaped final proofreading. 61-year-old male past medical history markable for hydrocephalus, intracranial shunt, and spina bifida presents the ER POV complaining of \" awakened at 6 AM right testicular pain. \"  Patient denies any penile discharge said he does have spina bifida and hydrocephalus so he is incontinent of urine at baseline. Patient denies any penile discharge left testicular pain nausea vomiting. Patient has not taken anything for the pain drove himself to the ER for further evaluation. Patient pain the pain is sharp located about the right testicle area radiating up into the abdomen. pt denies HA, vison changes, diff swallowing, CP, SOB, Abd pain, F/Ch, N/V, D/Cons or other current systemic complaints    Social/ PSH reviewed in EMR    EMR Chart Reviewed           Past Medical History:   Diagnosis Date    Hydrocephalus     Intracranial shunt     Spina bifida ([facility] [address]        Past Surgical History:   Procedure Laterality Date    HX APPENDECTOMY           No family history on file.     Social History     Socioeconomic History    Marital status: SINGLE     Spouse name: Not on file    Number of children: Not on file    Years of education: Not on file    Highest education level: Not on file   Occupational History    Not on file   Tobacco Use    Smoking status: Never Smoker    Smokeless tobacco: Never Used   Substance and Sexual Activity    Alcohol use: Not on file    Drug use: Not on file    Sexual activity: Not on file   Other Topics Concern    Not on file   Social History Narrative    Not on file     Social Determinants of Health     Financial Resource Strain:     Difficulty of Paying Living Expenses: Not on file   Food Insecurity:     Worried About Running Out of Food in the Last Year: Not on file    Ran Out of Food in the Last Year: Not on file   Transportation Needs:     Lack of Transportation (Medical): Not on file    Lack of Transportation (Non-Medical): Not on file   Physical Activity:     Days of Exercise per Week: Not on file    Minutes of Exercise per Session: Not on file   Stress:     Feeling of Stress : Not on file   Social Connections:     Frequency of Communication with Friends and Family: Not on file    Frequency of Social Gatherings with Friends and Family: Not on file    Attends Hindu Services: Not on file    Active Member of [address] [place] [address] [employer] or Organizations: Not on file    Attends Club or Organization Meetings: Not on file    Marital Status: Not on file   Intimate Partner Violence:     Fear of Current or Ex-Partner: Not on file    Emotionally Abused: Not on file    Physically Abused: Not on file    Sexually Abused: Not on file   Housing Stability:     Unable to Pay for Housing in the Last Year: Not on file    Number of [place] in the Last Year: Not on file    Unstable Housing in the Last Year: Not on file         ALLERGIES: Latex    Review of Systems   Constitutional: Negative for chills and fever. HENT: Negative for drooling, trouble swallowing and voice change. Respiratory: Negative for cough and chest tightness. Gastrointestinal: Negative for abdominal pain, diarrhea, nausea and vomiting. Genitourinary: Positive for scrotal swelling and testicular pain. Negative for decreased urine volume, dysuria, flank pain, penile pain, penile swelling and urgency. Musculoskeletal: Negative for back pain. Skin: Negative for rash. Neurological: Negative for facial asymmetry and speech difficulty. All other systems reviewed and are negative.       Vitals:    01/29/22 1445 01/29/22 1504 01/29/22 1510 01/29/22 1515   BP: 127/84 (!) 130/92  132/68 Pulse: 86 82 87 71   Resp: 13 19 15 12   Temp:       SpO2: 98% 98% 99% 100%   Weight:                Physical Exam  Vitals and nursing note reviewed. Constitutional:       General: He is not in acute distress. Appearance: Normal appearance. He is well-developed. He is not ill-appearing, toxic-appearing or diaphoretic. HENT:      Head: Normocephalic and atraumatic. Right Ear: External ear normal.      Left Ear: External ear normal.      Mouth/Throat:      Mouth: Mucous membranes are moist.      Pharynx: No oropharyngeal exudate or posterior oropharyngeal erythema. Eyes:      General: No scleral icterus. Right eye: No discharge. Left eye: No discharge. Extraocular Movements: Extraocular movements intact. Conjunctiva/sclera: Conjunctivae normal.      Pupils: Pupils are equal, round, and reactive to light. Cardiovascular:      Rate and Rhythm: Normal rate and regular rhythm. Pulses: Normal pulses. Heart sounds: Normal heart sounds. No murmur heard. No friction rub. No gallop. Pulmonary:      Effort: Pulmonary effort is normal. No respiratory distress. Breath sounds: Normal breath sounds. No stridor. No wheezing, rhonchi or rales. Chest:      Chest wall: No tenderness. Abdominal:      General: Bowel sounds are normal. There is no distension. Palpations: Abdomen is soft. There is no mass. Tenderness: There is no abdominal tenderness. There is no guarding or rebound. Hernia: No hernia is present. There is no hernia in the left inguinal area or right inguinal area. Genitourinary:     Penis: Circumcised. No phimosis, paraphimosis, hypospadias, erythema, tenderness, discharge, swelling or lesions. Testes:         Right: Mass, tenderness and swelling present. Testicular hydrocele or varicocele not present. Right testis is descended. Cremasteric reflex is absent.           Left: Mass, tenderness, swelling or testicular hydrocele not present. Left testis is descended. Cremasteric reflex is present. Epididymis:      Left: Normal.      Vinayak stage (genital): 5. Comments: Noted high-riding R testicle w/ swelling/ very ttp; unable to palp epididymis 2ary to pt discomfort; decreased cemasteric on R;     No redness/ cellulitis/ lesions noted;   Musculoskeletal:         General: No deformity or signs of injury. Normal range of motion. Cervical back: Normal range of motion and neck supple. No tenderness. Right lower leg: No edema. Left lower leg: No edema. Lymphadenopathy:      Cervical: No cervical adenopathy. Lower Body: No right inguinal adenopathy. No left inguinal adenopathy. Skin:     General: Skin is warm and dry. Capillary Refill: Capillary refill takes less than 2 seconds. Findings: No bruising, erythema, lesion or rash. Neurological:      General: No focal deficit present. Mental Status: He is alert and oriented to person, place, and time. Cranial Nerves: No cranial nerve deficit. Motor: No weakness. Coordination: Coordination normal.      Gait: Gait normal.          MDM       Procedures    8:05 AM  Pt at US;     8:26 AM  \"Feeling better\". Patient told of possible torsion on the ultrasound agrees with urology consultation    Procedure Note - R testicle de-Torsion  8:30 AM   Performed by: Brooke Watson MD  R testicle  Was attempted to de-torsed   The procedure took 1-15 minutes, and pt tolerated well. CONSULT  NOTE    Katelin Barker MD spoke with   Specialty: Urology  Discussed pt's hx, disposition, and available diagnostic and imaging results. Reviewed care plans. Consulting physician agrees with plans as outlined    8:55 AM  I paged urology but have not received a call back. Seems to be some confusion as to who is on-call or if they cover the [place] ER. We will proceed with transfer to [facility] to help facilitate the patient's timely detorsion.   Dr. Ankush Schneider has graciously accepted the patient in transfer we will send the patient lites and sirens to the [facility].

## 2022-01-29 NOTE — ED NOTES
Patient currently is being seen by urology. It appears he still has the torsion. He will take him to the OR.

## 2022-01-29 NOTE — ANESTHESIA PREPROCEDURE EVALUATION
Relevant Problems   No relevant active problems       Anesthetic History   No history of anesthetic complications            Review of Systems / Medical History  Patient summary reviewed, nursing notes reviewed and pertinent labs reviewed    Pulmonary  Within defined limits                 Neuro/Psych             Comments: H/o of spina bifida with  shunt in right side Cardiovascular  Within defined limits                     GI/Hepatic/Renal  Within defined limits              Endo/Other  Within defined limits           Other Findings            Physical Exam    Airway  Mallampati: II  TM Distance: 4 - 6 cm  Neck ROM: normal range of motion   Mouth opening: Normal     Cardiovascular    Rhythm: regular  Rate: normal         Dental  No notable dental hx       Pulmonary  Breath sounds clear to auscultation               Abdominal  GI exam deferred       Other Findings            Anesthetic Plan    ASA: 2, emergent  Anesthesia type: general          Induction: Intravenous  Anesthetic plan and risks discussed with: Patient

## 2022-01-29 NOTE — DISCHARGE INSTRUCTIONS
You will be scheduled for a follow up appointment with Dr. Jed Ganser in 3-4 weeks with a scrotal US. If you have any questions regarding your appointment please call the number provided below during business hours. Please arrive at least 15 minutes prior to the scheduled appointment to allow for enough time to check in. Any dressings in place after surgery can be removed after 24 hours. Surgical glue overlying the incision is designed to fall off on its own over the next week or two. All of the sutures will dissolve or fall off on their own. It is very helpful to apply an ice pack (wrapped in a towel, do not apply the ice pack directly to skin) intermittently for 20 minutes for the first couple of days after your surgery. Alternate 20 minutes of the ice pack being on followed by 20 minutes off while you're awake to help with pain control. Elevation of the scrotum can also help with recovery. Wear tight fitting underwear (mesh underwear, jock strap, briefs or compression shorts instead of boxers) for the first 2 weeks or so after surgery to help reduce swelling of the scrotum. Stuffing fluffed gauze or tissues under the scrotum can be helpful as well. When you are sitting at home use a towel underneath the scrotum to aid in elevation over the same time frame. You can shower 24 hours after surgery but do not soak underwater (no bath tubs, swimming pools, hot tubs) for 4 weeks. Wash the surgical site with mild soap and water, but do not scrub vigorously. Withhold from sexual activity for ~2 weeks after surgery (no sex, masturbation, etc.). No heavy lifting >10 pounds for 2 weeks after surgery (until the surgical site has fully healed). You may see bruising and swelling of the penis and/or scrotum. This is normal for a few weeks. A minor amount of bleeding is acceptable (you may see a small spot of blood on your underwear as you recover).  If there is active bleeding please apply gentle yet firm pressure to the site for 10 minutes. If the bleeding does not stop, please call the numbers provided below. You will be discharged with a prescription for oxycodone to be used for pain control on an as needed basis every 4-6 hours. Oxycodone is a narcotic that may make you drowsy, sleepy and reduce your reaction time. You should not drive while taking this medication. You may also take ibuprofen and/or Tylenol as needed for pain. You will be prescribed stool softeners to aid with bowel function (Senna-Colace). Hold these medications if you have diarrhea. You may take over-the-counter laxatives such as MiraLAX or Milk of Magnesia as well if you are passing gas but feeling constipated. [address] ([phone] during business hours, or [phone] after hours) for fever >101F by mouth, uncontrolled nausea or vomiting, pain uncontrolled by medication, signs of wound infection (rapidly spreading redness/swelling, purulent discharge, increasing bleeding from wounds, or separation of wound); also call for any new or concerning symptoms. Driving should be avoided for at least 24 hours after surgery/anesthesia and longer if you are using oxycodone. Do not drive while taking narcotic pain medications. Take all medications as prescribed. Daily walks are encouraged. Prolonged sitting or lying in bed should be avoided. You can expect to return to work as soon as 1-2 weeks following surgery, or as instructed.

## 2022-01-29 NOTE — ED NOTES
TRANSFER - OUT REPORT:    Verbal report given to Stella RIDDLE (name) on Julia Krishnan  being transferred to [facility] ED (unit) for urgent transfer       Report consisted of patients Situation, Background, Assessment and   Recommendations(SBAR). Information from the following report(s) SBAR, Kardex and ED Summary was reviewed with the receiving nurse. Lines:   Peripheral IV 01/29/22 Left Antecubital (Active)        Opportunity for questions and clarification was provided.       Patient transported with:    RAUL

## 2022-01-29 NOTE — BRIEF OP NOTE
Brief Postoperative Note    Patient: Mirna Perez  YOB: 1995  MRN: [id number]    Date of Procedure: 1/29/2022     Pre-Op Diagnosis: right testicular torsion    Post-Op Diagnosis: Same as preoperative diagnosis. Procedure(s):  RIGHT TESTICULAR TORSION REPAIR, bilateral orchiopexy, scrotal exploration    Surgeon(s):  Lyn Stafford MD    Surgical Assistant: Surg Asst-1: Tiffanie Gallo    Anesthesia: General     Estimated Blood Loss (mL): Minimal    Complications: None    Specimens: None    Implants: None    Drains: None    Findings: 540 degrees right testicular torsion. Bilateral orchiopexy.     Electronically Signed by Goyo Phoenix MD on 1/29/2022 at 2:30 PM

## 2022-01-29 NOTE — ROUTINE PROCESS
Patient: Sarah Grimes MRN: [id number]  SSN: [id number]   YOB: 1995  Age: 27 y.o. Sex: male     Patient is status post Procedure(s) with comments:  RIGHT TESTICULAR TORSION REPAIR, bilateral orchiopexy, scrotal exploration - RIGHT TESTICULAR TORSION REPAIR, bilateral orchiopexy, scrotal exploration.     Surgeon(s) and Role:     * Flynn Maravilla MD - Primary    Local/Dose/Irrigation:  0.9% normal saline used for irrigation                  Peripheral IV 01/29/22 Left Antecubital (Active)       Peripheral IV 01/29/22 Left Antecubital (Active)   Site Assessment Clean, dry, & intact 01/29/22 1022   Phlebitis Assessment 0 01/29/22 1022   Infiltration Assessment 0 01/29/22 1022   Dressing Status Clean, dry, & intact 01/29/22 1022   Hub Color/Line Status Green 01/29/22 1022   Action Taken Blood drawn 01/29/22 1022       Peripheral IV 01/29/22 Right Antecubital (Active)   Site Assessment Clean, dry, & intact 01/29/22 1122   Phlebitis Assessment 0 01/29/22 1122   Infiltration Assessment 0 01/29/22 1122   Dressing Status Clean, dry, & intact 01/29/22 1122   Hub Color/Line Status Pink 01/29/22 1122   Action Taken Blood drawn 01/29/22 1122            Airway - Endotracheal Tube 01/29/22 Oral (Active)                   Dressing/Packing:  Incision 01/29/22 Scrotum-Dressing/Treatment: Other (Comment) (fluffs with scrotal support) (01/29/22 1343)    Splint/Cast:  ]    Other:  SCDs,

## 2022-01-29 NOTE — ED NOTES
Patient presents here in transfer from short [facility] ER for complaints of right testicular torsion documented by ultrasound. He was given pain medications and some IV fluids. An attempt was made to detorsed the testicle. The patient does not feel that the pain got any better after that. He was sent here for evaluation by urology. On exam, the patient has swelling of the right testicle. It is elevated compared to the left. The testicle is also quite tender. It is not dusky. A repeat ultrasound is ordered. I have spoken with urology and a are aware and will come to see for further evaluation and definitive treatment.   Patient has been kept n.p.o.

## 2022-01-29 NOTE — OP NOTES
Mounika Castillo  [id number]    Preoperative Diagnosis: Right testicular torsion    Postoperative Diagnosis: Same    Procedure(s) Performed:  1. Scrotal exploration  2. Reduction right testicular torsion  3. Bilateral orchiopexy    Surgeon: Angelic Herbert    Assistant:     Anesthesia: General    EBL: 10cc    Specimen: None    Cultures: None    Tubes/Lines/Drains: None    Implants: None    Complications: None    Indications: 21yo male with history spina bifida presented with abrupt onset right testicular pain with scrotal US x2 (second performed after attempt at detorsion in ER with relative improvement in symptoms) showing right testicular torsion. I recommended scrotal exploration, right testicular torsion reduction, bilateral orchiopexy, possible right orchiectomy. R/B/A to procedure discussed with patient who agreed to proceed. Findings: Right testicular torsion 540 degrees. Detorsed. Adequate vascular flow to right testicle and spermatic cord confirmed with Doppler. Bilateral orchiopexy. Description:   The patient was correctly identified in the preoperative holding area where risks, benefits & alternatives to the procedure were reviewed. Informed consent was obtained. The patient was taken to the operating room where anesthesia was induced. The patient was placed in the supine position and all pressure points were appropriately padded. Perioperative antibiotics were administered. The surgical field was prepped and draped in the usual sterile fashion. A surgical timeout was performed. We began the procedure by marking out the intended incision in the midline scrotal raphae. The percent Marcaine without epinephrine was used to infiltrate the subcutaneous layers at the site for local anesthetic. A midline scrotal incision was made with a scalpel. We began by first directing our attention to the right hemiscrotum.   The subcutaneous layer and dartos tissues were dissected with Bovie electrocautery, hemostat and blunt dissection. Eventually this allowed for delivery of the right testicle and surrounding layers. The tunica vaginalis was entered. The right testicle was discolored purple and ischemic. The tunica vaginalis was opened further to directly examine the right spermatic cord which was torsed 540°. This was immediately detorsed, and the right testicle and spermatic cord were wrapped and warm saline soaked gauze and set aside. I then turned my attention to the left hemiscrotum. The left testicle was somewhat smaller than the right testicle at baseline. The left testicle and spermatic cord was delivered from the scrotum. The tunica vaginalis was entered. The left testicle appeared healthy with no evidence of torsion. The left hemiscrotum was dissected bluntly to create a pouch for the pexy process. The left testicle was then pexied in 3 separate places including laterally, inferiorly and medially with 4-0 Prolene. We then turned our attention back to the right testicle. Despite it having some purple discoloration he did appear to regain some vascular flow. A Doppler ultrasound was used to confirm that the spermatic cord had quite good vascular flow and the base of the testicle had arterial waveforms noted as well. I made the decision to spare the right testicle. This was also pexied in 3 areas as noted above, laterally, medially, inferiorly with 4-0 Prolene. Bilateral spermatic cord blocks were performed with quarter percent Marcaine plain. The surgical site was irrigated with saline. Hemostasis was obtained. The dartos layer was closed in running fashion with 4-0 Vicryl. Further local anesthesia was applied to the dartos layer. The skin was closed with 4-0 Monocryl in horizontal mattress fashion. The surgical site was cleaned and dried. Dermabond was applied. Fluff gauze, a diaper followed by scrotal support were then applied.     The patient was then woken up from anesthesia and taken to recovery unit for routine postoperative care. Post-Op Plan:  I discussed the case with his mother, Raul Macias, who coincidentally is the grand daughter of one of my other patients. - Discharge from PACU when criteria met with prescriptions for oxycodone and stool softeners. I will make an follow-up in about 4 weeks with a scrotal US.

## 2022-01-29 NOTE — ED NOTES
TRANSFER - OUT REPORT:    Verbal report given to [employer] (name) on Marc Bernheim  being transferred to PACU (unit) for ordered procedure       Report consisted of patients Situation, Background, Assessment and   Recommendations(SBAR). Information from the following report(s) SBAR, ED Summary and Intake/Output was reviewed with the receiving nurse. Lines:   Peripheral IV 01/29/22 Left Antecubital (Active)       Peripheral IV 01/29/22 Left Antecubital (Active)   Site Assessment Clean, dry, & intact 01/29/22 1022   Phlebitis Assessment 0 01/29/22 1022   Infiltration Assessment 0 01/29/22 1022   Dressing Status Clean, dry, & intact 01/29/22 1022   Hub Color/Line Status Green 01/29/22 1022   Action Taken Blood drawn 01/29/22 1022       Peripheral IV 01/29/22 Right Antecubital (Active)   Site Assessment Clean, dry, & intact 01/29/22 1122   Phlebitis Assessment 0 01/29/22 1122   Infiltration Assessment 0 01/29/22 1122   Dressing Status Clean, dry, & intact 01/29/22 1122   Hub Color/Line Status Pink 01/29/22 1122   Action Taken Blood drawn 01/29/22 1122        Opportunity for questions and clarification was provided.       Patient transported with:   Registered Nurse

## 2022-01-31 LAB
BACTERIA SPEC CULT: ABNORMAL
CC UR VC: ABNORMAL
SERVICE CMNT-IMP: ABNORMAL

## 2022-02-03 LAB
BACTERIA SPEC CULT: NORMAL
SERVICE CMNT-IMP: NORMAL

## 2023-05-12 RX ORDER — ONDANSETRON 4 MG/1
TABLET, ORALLY DISINTEGRATING ORAL EVERY 8 HOURS PRN
COMMUNITY
Start: 2019-07-13

## 2023-05-12 RX ORDER — AMOXICILLIN 250 MG
1 CAPSULE ORAL 2 TIMES DAILY
COMMUNITY
Start: 2022-01-29

## (undated) DEVICE — MINOR BASIN -SMH: Brand: MEDLINE INDUSTRIES, INC.

## (undated) DEVICE — GLOVE ORANGE PI 7   MSG9070

## (undated) DEVICE — SUTURE VCRL SZ 4-0 L27IN ABSRB UD L26MM SH 1/2 CIR J415H

## (undated) DEVICE — ELECTRODE NDL 2.8IN COAT VALLEYLAB

## (undated) DEVICE — HYPODERMIC SAFETY NEEDLE: Brand: MONOJECT

## (undated) DEVICE — SPONGE GZ W6XL6IN COT 6 PLY SUP FLUF EXTRA ABSRB FOR PRE OP

## (undated) DEVICE — GOWN,SIRUS,NONRNF,SETINSLV,XL,20/CS: Brand: MEDLINE

## (undated) DEVICE — TRAY PREP DRY W/ PREM GLV 2 APPL 6 SPNG 2 UNDPD 1 OVERWRAP

## (undated) DEVICE — SUTURE MCRYL SZ 4-0 L27IN ABSRB UD L19MM PS-2 1/2 CIR PRIM Y426H

## (undated) DEVICE — ATHLETIC SUPPORTER LATEX FREE, MEDIUM

## (undated) DEVICE — SOLUTION IRRIG 1000ML 0.9% SOD CHL USP POUR PLAS BTL

## (undated) DEVICE — SYR 5ML 1/5 GRAD LL NSAF LF --

## (undated) DEVICE — HANDLE LT SNAP ON ULT DURABLE LENS FOR TRUMPF ALC DISPOSABLE

## (undated) DEVICE — REM POLYHESIVE ADULT PATIENT RETURN ELECTRODE: Brand: VALLEYLAB

## (undated) DEVICE — SPONGE LAP 18X18IN STRL -- 5/PK

## (undated) DEVICE — DERMABOND SKIN ADH 0.7ML -- DERMABOND ADVANCED 12/BX

## (undated) DEVICE — SYR 10ML LUER LOK 1/5ML GRAD --

## (undated) DEVICE — SUTURE PROL SZ 4-0 L18IN NONABSORBABLE BLU RB-1 L17MM 1/2 8757H

## (undated) DEVICE — SYRINGE IRRIG 60ML SFT PLIABLE BLB EZ TO GRP 1 HND USE W/

## (undated) DEVICE — DRAPE,T,LAPARO,TRANS,STERILE: Brand: MEDLINE